# Patient Record
Sex: MALE | Race: WHITE | NOT HISPANIC OR LATINO | URBAN - METROPOLITAN AREA
[De-identification: names, ages, dates, MRNs, and addresses within clinical notes are randomized per-mention and may not be internally consistent; named-entity substitution may affect disease eponyms.]

---

## 2017-02-09 ENCOUNTER — FOLLOW UP (OUTPATIENT)
Dept: URBAN - METROPOLITAN AREA CLINIC 26 | Facility: CLINIC | Age: 62
End: 2017-02-09

## 2017-02-09 VITALS — SYSTOLIC BLOOD PRESSURE: 110 MMHG | HEART RATE: 68 BPM | DIASTOLIC BLOOD PRESSURE: 80 MMHG | HEIGHT: 60 IN

## 2017-02-09 DIAGNOSIS — H33.002: ICD-10-CM

## 2017-02-09 DIAGNOSIS — H35.373: ICD-10-CM

## 2017-02-09 DIAGNOSIS — H02.833: ICD-10-CM

## 2017-02-09 DIAGNOSIS — H43.391: ICD-10-CM

## 2017-02-09 DIAGNOSIS — H02.836: ICD-10-CM

## 2017-02-09 DIAGNOSIS — H35.352: ICD-10-CM

## 2017-02-09 DIAGNOSIS — H25.11: ICD-10-CM

## 2017-02-09 DIAGNOSIS — H26.492: ICD-10-CM

## 2017-02-09 PROCEDURE — 92014 COMPRE OPH EXAM EST PT 1/>: CPT

## 2017-02-09 PROCEDURE — 92134 CPTRZ OPH DX IMG PST SGM RTA: CPT

## 2017-02-09 ASSESSMENT — VISUAL ACUITY
OS_SC: 20/60
OD_SC: 20/40+1
OD_PH: 20/25
OS_PH: 20/30+1

## 2017-02-09 ASSESSMENT — TONOMETRY
OD_IOP_MMHG: 11
OS_IOP_MMHG: 11

## 2017-08-09 ENCOUNTER — FOLLOW UP (OUTPATIENT)
Dept: URBAN - METROPOLITAN AREA CLINIC 26 | Facility: CLINIC | Age: 62
End: 2017-08-09

## 2017-08-09 VITALS — SYSTOLIC BLOOD PRESSURE: 128 MMHG | DIASTOLIC BLOOD PRESSURE: 84 MMHG | HEART RATE: 80 BPM | HEIGHT: 60 IN

## 2017-08-09 DIAGNOSIS — H43.391: ICD-10-CM

## 2017-08-09 DIAGNOSIS — H33.002: ICD-10-CM

## 2017-08-09 DIAGNOSIS — H35.373: ICD-10-CM

## 2017-08-09 DIAGNOSIS — H35.352: ICD-10-CM

## 2017-08-09 PROCEDURE — 92014 COMPRE OPH EXAM EST PT 1/>: CPT

## 2017-08-09 PROCEDURE — 92250 FUNDUS PHOTOGRAPHY W/I&R: CPT

## 2017-08-09 ASSESSMENT — TONOMETRY
OS_IOP_MMHG: 12
OD_IOP_MMHG: 11

## 2017-08-09 ASSESSMENT — VISUAL ACUITY
OS_CC: 20/40
OD_CC: 20/20-2

## 2018-04-30 ENCOUNTER — FOLLOW UP (OUTPATIENT)
Dept: URBAN - METROPOLITAN AREA CLINIC 26 | Facility: CLINIC | Age: 63
End: 2018-04-30

## 2018-04-30 VITALS — BODY MASS INDEX: 36.57 KG/M2 | HEIGHT: 72 IN | WEIGHT: 270 LBS

## 2018-04-30 DIAGNOSIS — H02.836: ICD-10-CM

## 2018-04-30 DIAGNOSIS — H02.833: ICD-10-CM

## 2018-04-30 DIAGNOSIS — H33.002: ICD-10-CM

## 2018-04-30 DIAGNOSIS — H43.392: ICD-10-CM

## 2018-04-30 DIAGNOSIS — H04.123: ICD-10-CM

## 2018-04-30 DIAGNOSIS — H35.352: ICD-10-CM

## 2018-04-30 DIAGNOSIS — H35.373: ICD-10-CM

## 2018-04-30 DIAGNOSIS — H43.391: ICD-10-CM

## 2018-04-30 DIAGNOSIS — H26.492: ICD-10-CM

## 2018-04-30 DIAGNOSIS — H25.11: ICD-10-CM

## 2018-04-30 PROCEDURE — 92014 COMPRE OPH EXAM EST PT 1/>: CPT

## 2018-04-30 PROCEDURE — 92250 FUNDUS PHOTOGRAPHY W/I&R: CPT

## 2018-04-30 PROCEDURE — 92134 CPTRZ OPH DX IMG PST SGM RTA: CPT

## 2018-04-30 ASSESSMENT — TONOMETRY
OS_IOP_MMHG: 13
OD_IOP_MMHG: 12

## 2018-04-30 ASSESSMENT — VISUAL ACUITY
OD_CC: 20/20
OS_CC: 20/40-1

## 2019-05-17 ENCOUNTER — IMPORTED ENCOUNTER (OUTPATIENT)
Dept: URBAN - METROPOLITAN AREA CLINIC 31 | Facility: CLINIC | Age: 64
End: 2019-05-17

## 2019-05-17 PROBLEM — H59.812: Noted: 2019-05-17

## 2019-05-17 PROBLEM — H25.11: Noted: 2019-05-17

## 2019-05-17 PROCEDURE — 92250 FUNDUS PHOTOGRAPHY W/I&R: CPT

## 2019-05-17 PROCEDURE — 92004 COMPRE OPH EXAM NEW PT 1/>: CPT

## 2019-12-27 ENCOUNTER — UNSCHEDULED FOLLOW UP (OUTPATIENT)
Dept: URBAN - METROPOLITAN AREA CLINIC 26 | Facility: CLINIC | Age: 64
End: 2019-12-27

## 2019-12-27 DIAGNOSIS — H43.391: ICD-10-CM

## 2019-12-27 DIAGNOSIS — H35.61: ICD-10-CM

## 2019-12-27 DIAGNOSIS — H43.392: ICD-10-CM

## 2019-12-27 DIAGNOSIS — H35.352: ICD-10-CM

## 2019-12-27 DIAGNOSIS — H33.002: ICD-10-CM

## 2019-12-27 DIAGNOSIS — H35.373: ICD-10-CM

## 2019-12-27 PROCEDURE — 92250 FUNDUS PHOTOGRAPHY W/I&R: CPT

## 2019-12-27 PROCEDURE — 92014 COMPRE OPH EXAM EST PT 1/>: CPT

## 2019-12-27 PROCEDURE — 92226 OPHTHALMOSCOPY (SUB): CPT

## 2019-12-27 ASSESSMENT — VISUAL ACUITY
OS_CC: 20/40
OD_CC: 20/40

## 2019-12-27 ASSESSMENT — TONOMETRY
OD_IOP_MMHG: 12
OS_IOP_MMHG: 13

## 2020-01-20 ENCOUNTER — FOLLOW UP (OUTPATIENT)
Dept: URBAN - METROPOLITAN AREA CLINIC 26 | Facility: CLINIC | Age: 65
End: 2020-01-20

## 2020-01-20 VITALS
HEART RATE: 71 BPM | WEIGHT: 252 LBS | SYSTOLIC BLOOD PRESSURE: 138 MMHG | HEIGHT: 72 IN | DIASTOLIC BLOOD PRESSURE: 87 MMHG | BODY MASS INDEX: 34.13 KG/M2

## 2020-01-20 DIAGNOSIS — H25.11: ICD-10-CM

## 2020-01-20 DIAGNOSIS — H02.833: ICD-10-CM

## 2020-01-20 DIAGNOSIS — H43.392: ICD-10-CM

## 2020-01-20 DIAGNOSIS — H02.836: ICD-10-CM

## 2020-01-20 DIAGNOSIS — H33.002: ICD-10-CM

## 2020-01-20 DIAGNOSIS — H04.123: ICD-10-CM

## 2020-01-20 DIAGNOSIS — H35.61: ICD-10-CM

## 2020-01-20 DIAGNOSIS — H43.811: ICD-10-CM

## 2020-01-20 DIAGNOSIS — H43.11: ICD-10-CM

## 2020-01-20 DIAGNOSIS — H35.352: ICD-10-CM

## 2020-01-20 DIAGNOSIS — H53.8: ICD-10-CM

## 2020-01-20 DIAGNOSIS — H35.373: ICD-10-CM

## 2020-01-20 DIAGNOSIS — H26.492: ICD-10-CM

## 2020-01-20 PROCEDURE — 92014 COMPRE OPH EXAM EST PT 1/>: CPT

## 2020-01-20 PROCEDURE — 92201 OPSCPY EXTND RTA DRAW UNI/BI: CPT

## 2020-01-20 PROCEDURE — 92235 FLUORESCEIN ANGRPH MLTIFRAME: CPT

## 2020-01-20 ASSESSMENT — TONOMETRY
OD_IOP_MMHG: 12
OS_IOP_MMHG: 11

## 2020-01-20 ASSESSMENT — VISUAL ACUITY
OD_CC: 20/25
OS_CC: 20/30

## 2020-02-03 ENCOUNTER — FOLLOW UP (OUTPATIENT)
Dept: URBAN - METROPOLITAN AREA CLINIC 26 | Facility: CLINIC | Age: 65
End: 2020-02-03

## 2020-02-03 DIAGNOSIS — H35.352: ICD-10-CM

## 2020-02-03 DIAGNOSIS — H33.311: ICD-10-CM

## 2020-02-03 DIAGNOSIS — H02.833: ICD-10-CM

## 2020-02-03 DIAGNOSIS — H26.492: ICD-10-CM

## 2020-02-03 DIAGNOSIS — H25.11: ICD-10-CM

## 2020-02-03 DIAGNOSIS — H33.002: ICD-10-CM

## 2020-02-03 DIAGNOSIS — H43.811: ICD-10-CM

## 2020-02-03 DIAGNOSIS — H04.123: ICD-10-CM

## 2020-02-03 DIAGNOSIS — H43.11: ICD-10-CM

## 2020-02-03 DIAGNOSIS — H53.8: ICD-10-CM

## 2020-02-03 DIAGNOSIS — H35.61: ICD-10-CM

## 2020-02-03 DIAGNOSIS — H43.392: ICD-10-CM

## 2020-02-03 DIAGNOSIS — H02.836: ICD-10-CM

## 2020-02-03 DIAGNOSIS — H35.373: ICD-10-CM

## 2020-02-03 PROCEDURE — 92250 FUNDUS PHOTOGRAPHY W/I&R: CPT

## 2020-02-03 PROCEDURE — 92014 COMPRE OPH EXAM EST PT 1/>: CPT

## 2020-02-03 PROCEDURE — 92134 CPTRZ OPH DX IMG PST SGM RTA: CPT

## 2020-02-03 PROCEDURE — 67145 PROPH RTA DTCHMNT PC: CPT

## 2020-02-03 ASSESSMENT — TONOMETRY
OD_IOP_MMHG: 16
OS_IOP_MMHG: 16

## 2020-02-03 ASSESSMENT — VISUAL ACUITY
OS_CC: 20/30
OD_CC: 20/25+2

## 2020-03-09 ENCOUNTER — POST OP-DILATION (OUTPATIENT)
Dept: URBAN - METROPOLITAN AREA CLINIC 26 | Facility: CLINIC | Age: 65
End: 2020-03-09

## 2020-03-09 DIAGNOSIS — H33.002: ICD-10-CM

## 2020-03-09 DIAGNOSIS — H02.833: ICD-10-CM

## 2020-03-09 DIAGNOSIS — H25.11: ICD-10-CM

## 2020-03-09 DIAGNOSIS — H35.373: ICD-10-CM

## 2020-03-09 DIAGNOSIS — H53.8: ICD-10-CM

## 2020-03-09 DIAGNOSIS — H02.836: ICD-10-CM

## 2020-03-09 DIAGNOSIS — H43.811: ICD-10-CM

## 2020-03-09 DIAGNOSIS — H43.11: ICD-10-CM

## 2020-03-09 DIAGNOSIS — H43.392: ICD-10-CM

## 2020-03-09 DIAGNOSIS — H04.123: ICD-10-CM

## 2020-03-09 DIAGNOSIS — H35.352: ICD-10-CM

## 2020-03-09 DIAGNOSIS — H35.61: ICD-10-CM

## 2020-03-09 DIAGNOSIS — H26.492: ICD-10-CM

## 2020-03-09 DIAGNOSIS — H33.311: ICD-10-CM

## 2020-03-09 PROCEDURE — 99024 POSTOP FOLLOW-UP VISIT: CPT

## 2020-03-09 PROCEDURE — 92250 FUNDUS PHOTOGRAPHY W/I&R: CPT

## 2020-03-09 ASSESSMENT — VISUAL ACUITY
OD_CC: 20/25
OS_CC: 20/25

## 2020-03-09 ASSESSMENT — TONOMETRY
OS_IOP_MMHG: 13
OD_IOP_MMHG: 13

## 2020-04-13 ENCOUNTER — FOLLOW UP (OUTPATIENT)
Dept: URBAN - METROPOLITAN AREA CLINIC 26 | Facility: CLINIC | Age: 65
End: 2020-04-13

## 2020-04-13 DIAGNOSIS — H35.352: ICD-10-CM

## 2020-04-13 DIAGNOSIS — H43.811: ICD-10-CM

## 2020-04-13 DIAGNOSIS — H35.373: ICD-10-CM

## 2020-04-13 DIAGNOSIS — H33.002: ICD-10-CM

## 2020-04-13 DIAGNOSIS — H33.311: ICD-10-CM

## 2020-04-13 PROCEDURE — 99024 POSTOP FOLLOW-UP VISIT: CPT

## 2020-04-13 PROCEDURE — 92201 OPSCPY EXTND RTA DRAW UNI/BI: CPT

## 2020-04-13 PROCEDURE — 92134 CPTRZ OPH DX IMG PST SGM RTA: CPT

## 2020-04-13 ASSESSMENT — VISUAL ACUITY
OS_CC: 20/30-1
OD_CC: 20/20-1

## 2020-04-13 ASSESSMENT — TONOMETRY
OD_IOP_MMHG: 20
OS_IOP_MMHG: 19

## 2020-05-28 ENCOUNTER — FOLLOW UP (OUTPATIENT)
Dept: URBAN - METROPOLITAN AREA CLINIC 26 | Facility: CLINIC | Age: 65
End: 2020-05-28

## 2020-05-28 DIAGNOSIS — H35.373: ICD-10-CM

## 2020-05-28 DIAGNOSIS — H43.811: ICD-10-CM

## 2020-05-28 DIAGNOSIS — H43.392: ICD-10-CM

## 2020-05-28 DIAGNOSIS — H33.002: ICD-10-CM

## 2020-05-28 DIAGNOSIS — H35.352: ICD-10-CM

## 2020-05-28 DIAGNOSIS — H53.8: ICD-10-CM

## 2020-05-28 DIAGNOSIS — H43.11: ICD-10-CM

## 2020-05-28 DIAGNOSIS — H33.311: ICD-10-CM

## 2020-05-28 DIAGNOSIS — H35.61: ICD-10-CM

## 2020-05-28 PROCEDURE — 92134 CPTRZ OPH DX IMG PST SGM RTA: CPT

## 2020-05-28 PROCEDURE — 92014 COMPRE OPH EXAM EST PT 1/>: CPT

## 2020-05-28 PROCEDURE — 92250 FUNDUS PHOTOGRAPHY W/I&R: CPT

## 2020-05-28 ASSESSMENT — TONOMETRY
OS_IOP_MMHG: 12
OD_IOP_MMHG: 12

## 2020-05-28 ASSESSMENT — VISUAL ACUITY
OD_CC: 20/30-2
OS_CC: 20/30-1

## 2020-09-17 ENCOUNTER — IMPORTED ENCOUNTER (OUTPATIENT)
Dept: URBAN - METROPOLITAN AREA CLINIC 31 | Facility: CLINIC | Age: 65
End: 2020-09-17

## 2020-09-17 PROBLEM — H43.811: Noted: 2020-09-17

## 2020-09-17 PROBLEM — H59.812: Noted: 2020-09-17

## 2020-09-17 PROBLEM — Z96.1: Noted: 2020-09-17

## 2020-09-17 PROBLEM — H25.811: Noted: 2020-09-17

## 2020-09-17 PROCEDURE — 92015 DETERMINE REFRACTIVE STATE: CPT

## 2020-09-17 PROCEDURE — 92014 COMPRE OPH EXAM EST PT 1/>: CPT

## 2020-09-17 PROCEDURE — 92250 FUNDUS PHOTOGRAPHY W/I&R: CPT

## 2020-11-30 ENCOUNTER — FOLLOW UP (OUTPATIENT)
Dept: URBAN - METROPOLITAN AREA CLINIC 26 | Facility: CLINIC | Age: 65
End: 2020-11-30

## 2020-11-30 DIAGNOSIS — H35.61: ICD-10-CM

## 2020-11-30 DIAGNOSIS — H53.8: ICD-10-CM

## 2020-11-30 DIAGNOSIS — H02.836: ICD-10-CM

## 2020-11-30 DIAGNOSIS — H35.373: ICD-10-CM

## 2020-11-30 DIAGNOSIS — H33.311: ICD-10-CM

## 2020-11-30 DIAGNOSIS — H43.11: ICD-10-CM

## 2020-11-30 DIAGNOSIS — H43.811: ICD-10-CM

## 2020-11-30 DIAGNOSIS — H26.492: ICD-10-CM

## 2020-11-30 DIAGNOSIS — H04.123: ICD-10-CM

## 2020-11-30 DIAGNOSIS — H33.002: ICD-10-CM

## 2020-11-30 DIAGNOSIS — H35.352: ICD-10-CM

## 2020-11-30 DIAGNOSIS — H25.11: ICD-10-CM

## 2020-11-30 DIAGNOSIS — H43.392: ICD-10-CM

## 2020-11-30 DIAGNOSIS — H02.833: ICD-10-CM

## 2020-11-30 PROCEDURE — 92134 CPTRZ OPH DX IMG PST SGM RTA: CPT

## 2020-11-30 PROCEDURE — 92014 COMPRE OPH EXAM EST PT 1/>: CPT

## 2020-11-30 PROCEDURE — 92250 FUNDUS PHOTOGRAPHY W/I&R: CPT

## 2020-11-30 RX ORDER — BROMFENAC SODIUM 0.7 MG/ML: 1 SOLUTION/ DROPS OPHTHALMIC ONCE A DAY

## 2020-11-30 ASSESSMENT — VISUAL ACUITY
OS_CC: 20/25
OD_CC: 20/20-2

## 2020-11-30 ASSESSMENT — TONOMETRY
OD_IOP_MMHG: 12
OS_IOP_MMHG: 13

## 2021-01-07 ENCOUNTER — FOLLOW UP (OUTPATIENT)
Dept: URBAN - METROPOLITAN AREA CLINIC 26 | Facility: CLINIC | Age: 66
End: 2021-01-07

## 2021-01-07 DIAGNOSIS — H33.002: ICD-10-CM

## 2021-01-07 DIAGNOSIS — H33.311: ICD-10-CM

## 2021-01-07 DIAGNOSIS — H53.8: ICD-10-CM

## 2021-01-07 DIAGNOSIS — H43.392: ICD-10-CM

## 2021-01-07 DIAGNOSIS — H43.11: ICD-10-CM

## 2021-01-07 DIAGNOSIS — H35.352: ICD-10-CM

## 2021-01-07 DIAGNOSIS — H43.811: ICD-10-CM

## 2021-01-07 DIAGNOSIS — H35.373: ICD-10-CM

## 2021-01-07 DIAGNOSIS — H35.61: ICD-10-CM

## 2021-01-07 PROCEDURE — 92014 COMPRE OPH EXAM EST PT 1/>: CPT

## 2021-01-07 PROCEDURE — 92134 CPTRZ OPH DX IMG PST SGM RTA: CPT

## 2021-01-07 RX ORDER — BROMFENAC 0.9 MG/ML: 1 SOLUTION/ DROPS OPHTHALMIC TWICE A DAY

## 2021-01-07 RX ORDER — LOTEPREDNOL ETABONATE 10 MG/ML: 1 SUSPENSION TOPICAL TWICE A DAY

## 2021-01-07 ASSESSMENT — TONOMETRY
OS_IOP_MMHG: 13
OD_IOP_MMHG: 12

## 2021-01-07 ASSESSMENT — VISUAL ACUITY
OD_SC: 20/20
OS_SC: 20/25

## 2021-01-19 NOTE — PATIENT DISCUSSION
- Updated glasses rx given today. Does not want progressives. Recommend using her current +2.75 readers for near. May try +1.00 readers for distance.

## 2021-01-19 NOTE — PATIENT DISCUSSION
- Follow up in 1 year for [facility], [facility]

## 2021-02-18 ENCOUNTER — FOLLOW UP (OUTPATIENT)
Dept: URBAN - METROPOLITAN AREA CLINIC 26 | Facility: CLINIC | Age: 66
End: 2021-02-18

## 2021-02-18 DIAGNOSIS — H35.373: ICD-10-CM

## 2021-02-18 DIAGNOSIS — H43.811: ICD-10-CM

## 2021-02-18 DIAGNOSIS — H33.311: ICD-10-CM

## 2021-02-18 DIAGNOSIS — H53.8: ICD-10-CM

## 2021-02-18 DIAGNOSIS — H33.002: ICD-10-CM

## 2021-02-18 DIAGNOSIS — H43.392: ICD-10-CM

## 2021-02-18 DIAGNOSIS — H35.352: ICD-10-CM

## 2021-02-18 DIAGNOSIS — H35.61: ICD-10-CM

## 2021-02-18 DIAGNOSIS — H43.11: ICD-10-CM

## 2021-02-18 PROCEDURE — 92134 CPTRZ OPH DX IMG PST SGM RTA: CPT

## 2021-02-18 PROCEDURE — 92014 COMPRE OPH EXAM EST PT 1/>: CPT

## 2021-02-18 ASSESSMENT — VISUAL ACUITY
OD_CC: 20/25-2
OS_CC: 20/25-2

## 2021-02-18 ASSESSMENT — TONOMETRY
OD_IOP_MMHG: 12
OS_IOP_MMHG: 12

## 2021-03-25 ENCOUNTER — FOLLOW UP (OUTPATIENT)
Dept: URBAN - METROPOLITAN AREA CLINIC 26 | Facility: CLINIC | Age: 66
End: 2021-03-25

## 2021-03-25 DIAGNOSIS — H53.8: ICD-10-CM

## 2021-03-25 DIAGNOSIS — H43.11: ICD-10-CM

## 2021-03-25 DIAGNOSIS — H33.002: ICD-10-CM

## 2021-03-25 DIAGNOSIS — H43.811: ICD-10-CM

## 2021-03-25 DIAGNOSIS — H35.61: ICD-10-CM

## 2021-03-25 DIAGNOSIS — H33.311: ICD-10-CM

## 2021-03-25 DIAGNOSIS — H43.392: ICD-10-CM

## 2021-03-25 DIAGNOSIS — H35.373: ICD-10-CM

## 2021-03-25 DIAGNOSIS — H35.352: ICD-10-CM

## 2021-03-25 PROCEDURE — 92014 COMPRE OPH EXAM EST PT 1/>: CPT

## 2021-03-25 PROCEDURE — 92134 CPTRZ OPH DX IMG PST SGM RTA: CPT

## 2021-03-25 ASSESSMENT — TONOMETRY
OS_IOP_MMHG: 14
OD_IOP_MMHG: 12

## 2021-03-25 ASSESSMENT — VISUAL ACUITY
OS_CC: 20/30+1
OD_CC: 20/20-2

## 2021-05-24 ENCOUNTER — FOLLOW UP (OUTPATIENT)
Dept: URBAN - METROPOLITAN AREA CLINIC 26 | Facility: CLINIC | Age: 66
End: 2021-05-24

## 2021-05-24 DIAGNOSIS — H43.392: ICD-10-CM

## 2021-05-24 DIAGNOSIS — H33.002: ICD-10-CM

## 2021-05-24 DIAGNOSIS — H43.811: ICD-10-CM

## 2021-05-24 DIAGNOSIS — H35.61: ICD-10-CM

## 2021-05-24 DIAGNOSIS — H43.11: ICD-10-CM

## 2021-05-24 DIAGNOSIS — H53.8: ICD-10-CM

## 2021-05-24 DIAGNOSIS — H35.373: ICD-10-CM

## 2021-05-24 DIAGNOSIS — H33.311: ICD-10-CM

## 2021-05-24 DIAGNOSIS — H35.352: ICD-10-CM

## 2021-05-24 PROCEDURE — 92014 COMPRE OPH EXAM EST PT 1/>: CPT

## 2021-05-24 PROCEDURE — 92134 CPTRZ OPH DX IMG PST SGM RTA: CPT

## 2021-05-24 PROCEDURE — 92250 FUNDUS PHOTOGRAPHY W/I&R: CPT

## 2021-05-24 ASSESSMENT — TONOMETRY
OD_IOP_MMHG: 14
OS_IOP_MMHG: 16

## 2021-05-24 ASSESSMENT — VISUAL ACUITY
OD_CC: 20/20-1
OS_CC: 20/30-2

## 2021-09-13 ENCOUNTER — FOLLOW UP (OUTPATIENT)
Dept: URBAN - METROPOLITAN AREA CLINIC 26 | Facility: CLINIC | Age: 66
End: 2021-09-13

## 2021-09-13 DIAGNOSIS — H43.811: ICD-10-CM

## 2021-09-13 DIAGNOSIS — H33.002: ICD-10-CM

## 2021-09-13 DIAGNOSIS — H43.11: ICD-10-CM

## 2021-09-13 DIAGNOSIS — H33.311: ICD-10-CM

## 2021-09-13 DIAGNOSIS — H35.373: ICD-10-CM

## 2021-09-13 DIAGNOSIS — H35.352: ICD-10-CM

## 2021-09-13 DIAGNOSIS — H35.61: ICD-10-CM

## 2021-09-13 DIAGNOSIS — H04.123: ICD-10-CM

## 2021-09-13 DIAGNOSIS — H43.392: ICD-10-CM

## 2021-09-13 PROCEDURE — 92014 COMPRE OPH EXAM EST PT 1/>: CPT

## 2021-09-13 PROCEDURE — 92134 CPTRZ OPH DX IMG PST SGM RTA: CPT

## 2021-09-13 ASSESSMENT — VISUAL ACUITY
OS_CC: 20/25
OD_CC: 20/20

## 2021-09-13 ASSESSMENT — TONOMETRY
OD_IOP_MMHG: 14
OS_IOP_MMHG: 15

## 2021-12-13 ENCOUNTER — FOLLOW UP (OUTPATIENT)
Dept: URBAN - METROPOLITAN AREA CLINIC 26 | Facility: CLINIC | Age: 66
End: 2021-12-13

## 2021-12-13 VITALS — BODY MASS INDEX: 34.54 KG/M2 | HEIGHT: 72 IN | WEIGHT: 255 LBS

## 2021-12-13 DIAGNOSIS — H43.392: ICD-10-CM

## 2021-12-13 DIAGNOSIS — H35.61: ICD-10-CM

## 2021-12-13 DIAGNOSIS — H04.123: ICD-10-CM

## 2021-12-13 DIAGNOSIS — H35.373: ICD-10-CM

## 2021-12-13 DIAGNOSIS — H35.352: ICD-10-CM

## 2021-12-13 DIAGNOSIS — H43.811: ICD-10-CM

## 2021-12-13 DIAGNOSIS — H33.002: ICD-10-CM

## 2021-12-13 DIAGNOSIS — H43.11: ICD-10-CM

## 2021-12-13 DIAGNOSIS — H33.311: ICD-10-CM

## 2021-12-13 PROCEDURE — 92134 CPTRZ OPH DX IMG PST SGM RTA: CPT

## 2021-12-13 PROCEDURE — 92250 FUNDUS PHOTOGRAPHY W/I&R: CPT

## 2021-12-13 PROCEDURE — 92014 COMPRE OPH EXAM EST PT 1/>: CPT

## 2021-12-13 ASSESSMENT — VISUAL ACUITY
OD_CC: 20/25-1
OS_CC: 20/25-1

## 2021-12-13 ASSESSMENT — TONOMETRY
OD_IOP_MMHG: 11
OS_IOP_MMHG: 21

## 2022-03-21 ENCOUNTER — FOLLOW UP (OUTPATIENT)
Dept: URBAN - METROPOLITAN AREA CLINIC 26 | Facility: CLINIC | Age: 67
End: 2022-03-21

## 2022-03-21 VITALS — BODY MASS INDEX: 34.54 KG/M2 | HEIGHT: 72 IN | WEIGHT: 255 LBS

## 2022-03-21 DIAGNOSIS — H35.61: ICD-10-CM

## 2022-03-21 DIAGNOSIS — H35.373: ICD-10-CM

## 2022-03-21 DIAGNOSIS — H04.123: ICD-10-CM

## 2022-03-21 DIAGNOSIS — H43.392: ICD-10-CM

## 2022-03-21 DIAGNOSIS — H43.811: ICD-10-CM

## 2022-03-21 DIAGNOSIS — H33.002: ICD-10-CM

## 2022-03-21 DIAGNOSIS — H43.11: ICD-10-CM

## 2022-03-21 DIAGNOSIS — H33.311: ICD-10-CM

## 2022-03-21 DIAGNOSIS — H35.352: ICD-10-CM

## 2022-03-21 PROCEDURE — 92012 INTRM OPH EXAM EST PATIENT: CPT

## 2022-03-21 PROCEDURE — 92134 CPTRZ OPH DX IMG PST SGM RTA: CPT

## 2022-03-21 ASSESSMENT — TONOMETRY
OS_IOP_MMHG: 18
OD_IOP_MMHG: 14

## 2022-03-21 ASSESSMENT — VISUAL ACUITY
OS_CC: 20/25-1
OD_CC: 20/20-1

## 2022-04-02 ASSESSMENT — VISUAL ACUITY
OD_PH: SC 20/30
OS_PH: CC 20/25 -2
OD_SC: 20/25+2
OS_CC: 20/50+2
OS_CC: 20/40
OD_SC: J2
OD_SC: 20/30
OS_SC: 20/40
OD_CC: 20/40+2
OS_PH: SC 20/30
OS_SC: 20/30-2
OD_CC: 20/50
OS_SC: J3

## 2022-04-02 ASSESSMENT — TONOMETRY
OS_IOP_MMHG: 16
OD_IOP_MMHG: 16
OS_IOP_MMHG: 16
OD_IOP_MMHG: 16

## 2022-11-14 ENCOUNTER — FOLLOW UP (OUTPATIENT)
Dept: URBAN - METROPOLITAN AREA CLINIC 26 | Facility: CLINIC | Age: 67
End: 2022-11-14

## 2022-11-14 DIAGNOSIS — H33.311: ICD-10-CM

## 2022-11-14 DIAGNOSIS — H35.61: ICD-10-CM

## 2022-11-14 DIAGNOSIS — H33.002: ICD-10-CM

## 2022-11-14 DIAGNOSIS — H35.373: ICD-10-CM

## 2022-11-14 DIAGNOSIS — H35.352: ICD-10-CM

## 2022-11-14 DIAGNOSIS — H43.11: ICD-10-CM

## 2022-11-14 DIAGNOSIS — H43.392: ICD-10-CM

## 2022-11-14 DIAGNOSIS — H43.811: ICD-10-CM

## 2022-11-14 DIAGNOSIS — H04.123: ICD-10-CM

## 2022-11-14 PROCEDURE — 92250 FUNDUS PHOTOGRAPHY W/I&R: CPT

## 2022-11-14 PROCEDURE — 92014 COMPRE OPH EXAM EST PT 1/>: CPT

## 2022-11-14 PROCEDURE — 92134 CPTRZ OPH DX IMG PST SGM RTA: CPT

## 2022-11-14 ASSESSMENT — TONOMETRY
OS_IOP_MMHG: 12
OD_IOP_MMHG: 16

## 2022-11-14 ASSESSMENT — VISUAL ACUITY
OS_CC: 20/25-1
OD_CC: 20/20-2

## 2024-04-08 ENCOUNTER — APPOINTMENT (RX ONLY)
Dept: URBAN - NONMETROPOLITAN AREA CLINIC 22 | Facility: CLINIC | Age: 69
Setting detail: DERMATOLOGY
End: 2024-04-08

## 2024-04-08 DIAGNOSIS — L81.4 OTHER MELANIN HYPERPIGMENTATION: ICD-10-CM

## 2024-04-08 DIAGNOSIS — L82.1 OTHER SEBORRHEIC KERATOSIS: ICD-10-CM

## 2024-04-08 DIAGNOSIS — D22 MELANOCYTIC NEVI: ICD-10-CM

## 2024-04-08 DIAGNOSIS — L57.8 OTHER SKIN CHANGES DUE TO CHRONIC EXPOSURE TO NONIONIZING RADIATION: ICD-10-CM

## 2024-04-08 DIAGNOSIS — D18.0 HEMANGIOMA: ICD-10-CM

## 2024-04-08 PROBLEM — D18.01 HEMANGIOMA OF SKIN AND SUBCUTANEOUS TISSUE: Status: ACTIVE | Noted: 2024-04-08

## 2024-04-08 PROBLEM — D22.5 MELANOCYTIC NEVI OF TRUNK: Status: ACTIVE | Noted: 2024-04-08

## 2024-04-08 PROCEDURE — 99203 OFFICE O/P NEW LOW 30 MIN: CPT

## 2024-04-08 PROCEDURE — ? COUNSELING

## 2024-04-08 PROCEDURE — ? SUNSCREEN RECOMMENDATIONS

## 2024-04-08 ASSESSMENT — LOCATION DETAILED DESCRIPTION DERM
LOCATION DETAILED: RIGHT INFERIOR UPPER BACK
LOCATION DETAILED: RIGHT SUPERIOR MEDIAL MIDBACK
LOCATION DETAILED: RIGHT MID-UPPER BACK
LOCATION DETAILED: RIGHT SUPERIOR UPPER BACK
LOCATION DETAILED: LEFT SUPERIOR UPPER BACK

## 2024-04-08 ASSESSMENT — LOCATION SIMPLE DESCRIPTION DERM
LOCATION SIMPLE: RIGHT UPPER BACK
LOCATION SIMPLE: LEFT UPPER BACK
LOCATION SIMPLE: RIGHT LOWER BACK

## 2024-04-08 ASSESSMENT — LOCATION ZONE DERM: LOCATION ZONE: TRUNK

## 2025-05-19 ENCOUNTER — APPOINTMENT (OUTPATIENT)
Dept: URBAN - NONMETROPOLITAN AREA CLINIC 22 | Facility: CLINIC | Age: 70
Setting detail: DERMATOLOGY
End: 2025-05-19

## 2025-05-19 DIAGNOSIS — L57.8 OTHER SKIN CHANGES DUE TO CHRONIC EXPOSURE TO NONIONIZING RADIATION: ICD-10-CM

## 2025-05-19 DIAGNOSIS — D18.0 HEMANGIOMA: ICD-10-CM

## 2025-05-19 DIAGNOSIS — D22 MELANOCYTIC NEVI: ICD-10-CM

## 2025-05-19 DIAGNOSIS — L81.4 OTHER MELANIN HYPERPIGMENTATION: ICD-10-CM

## 2025-05-19 DIAGNOSIS — L82.1 OTHER SEBORRHEIC KERATOSIS: ICD-10-CM

## 2025-05-19 PROBLEM — D18.01 HEMANGIOMA OF SKIN AND SUBCUTANEOUS TISSUE: Status: ACTIVE | Noted: 2025-05-19

## 2025-05-19 PROBLEM — D22.5 MELANOCYTIC NEVI OF TRUNK: Status: ACTIVE | Noted: 2025-05-19

## 2025-05-19 PROCEDURE — ? SUNSCREEN RECOMMENDATIONS

## 2025-05-19 PROCEDURE — 99213 OFFICE O/P EST LOW 20 MIN: CPT

## 2025-05-19 PROCEDURE — ? COUNSELING

## 2025-05-19 ASSESSMENT — LOCATION ZONE DERM: LOCATION ZONE: TRUNK

## 2025-05-19 ASSESSMENT — LOCATION DETAILED DESCRIPTION DERM
LOCATION DETAILED: LEFT SUPERIOR UPPER BACK
LOCATION DETAILED: RIGHT MID-UPPER BACK
LOCATION DETAILED: RIGHT INFERIOR UPPER BACK
LOCATION DETAILED: RIGHT SUPERIOR MEDIAL MIDBACK
LOCATION DETAILED: RIGHT SUPERIOR UPPER BACK

## 2025-05-19 ASSESSMENT — LOCATION SIMPLE DESCRIPTION DERM
LOCATION SIMPLE: LEFT UPPER BACK
LOCATION SIMPLE: RIGHT LOWER BACK
LOCATION SIMPLE: RIGHT UPPER BACK

## 2025-05-19 NOTE — PROCEDURE: MIPS QUALITY
Quality 47: Advance Care Plan: Advance care planning not documented, reason not otherwise specified.
Detail Level: Detailed
Quality 130: Documentation Of Current Medications In The Medical Record: Current Medications Documented
Quality 155: Falls Plan Of Care: Plan of Care not Documented, Reason not Otherwise Specified
Additional Notes: The E/M service provided during this visit was medically necessary, significant, and independent from the procedure(s) provided on the same date of service and included documented elements above and beyond the usual pre-, intra-, and post-operative work associated with the procedure(s).
Quality 226: Preventive Care And Screening: Tobacco Use: Screening And Cessation Intervention: Patient screened for tobacco use and is an ex/non-smoker
Quality 431: Preventive Care And Screening: Unhealthy Alcohol Use - Screening: Patient not identified as an unhealthy alcohol user when screened for unhealthy alcohol use using a systematic screening method